# Patient Record
Sex: FEMALE | Race: WHITE | NOT HISPANIC OR LATINO | Employment: FULL TIME | URBAN - METROPOLITAN AREA
[De-identification: names, ages, dates, MRNs, and addresses within clinical notes are randomized per-mention and may not be internally consistent; named-entity substitution may affect disease eponyms.]

---

## 2021-09-08 ENCOUNTER — OFFICE VISIT (OUTPATIENT)
Dept: DERMATOLOGY | Facility: IMAGING CENTER | Age: 61
End: 2021-09-08
Payer: COMMERCIAL

## 2021-09-08 VITALS — WEIGHT: 140 LBS | BODY MASS INDEX: 21.97 KG/M2 | TEMPERATURE: 97.6 F | HEIGHT: 67 IN

## 2021-09-08 DIAGNOSIS — D23.9 DERMATOFIBROMA: ICD-10-CM

## 2021-09-08 DIAGNOSIS — L82.1 SEBORRHEIC KERATOSES: ICD-10-CM

## 2021-09-08 DIAGNOSIS — L28.1 PRURIGO NODULARIS: ICD-10-CM

## 2021-09-08 PROCEDURE — 99203 OFFICE O/P NEW LOW 30 MIN: CPT | Performed by: NURSE PRACTITIONER

## 2021-09-08 RX ORDER — LORATADINE 10 MG/1
10 TABLET ORAL DAILY
COMMUNITY
End: 2023-04-07

## 2021-09-08 NOTE — PROGRESS NOTES
"DERMATOLOGY NOTE  NEW VISIT       Chief complaint: Skin Lesion     HPI/location: Posterior upper left arm, brown papule  Time present: 5 years  Painful lesion: No  Itching lesion: No  Enlarging lesion: No  Anything make it better or worse? No    HPI/location: right lower leg above, crusty papule  Time present: 5 years  Painful lesion: No  Itching lesion: No  Enlarging lesion: No  Anything make it better or worse? No        History of skin cancer: No  History of precancers/actinic keratoses: No  History of biopsies:Yes, Details: left forearm, benign 5+ years ago 2016  History of blistering/severe sunburns:No  Family history of skin cancer:No  Family history of atypical moles:No      Allergies   Allergen Reactions   • Penicillins         MEDICATIONS:  Medications relevant to specialty reviewed.     REVIEW OF SYSTEMS:   Positive for skin (see HPI)  Negative for fevers and chills       EXAM:  Temp 36.4 °C (97.6 °F)   Ht 1.702 m (5' 7\")   Wt 63.5 kg (140 lb)   BMI 21.93 kg/m²   Constitutional: Well-developed, well-nourished, and in no distress.     A focused skin exam was performed including the affected areas of the left upper arm. Notable findings on exam today listed below and/or in assessment/plan.     Firm, pink to hyperpigmented, dome-shaped papule to left upper arm  Macular sk to right calf  Hyperkeratotic papule to anterior right ankle    IMPRESSION / PLAN:    1. Dermatofibroma  - Benign-appearing nature of lesions discussed. Advised to return to clinic for any new or concerning changes.  PA started for punch removal    2. Prurigo nodule  - Benign-appearing nature of lesions discussed. Advised to return to clinic for any new or concerning changes.  PA started for punch removal    3. Seborrheic keratoses  - Benign-appearing nature of lesions discussed. Advised to return to clinic for any new or concerning changes.  - ABCDE's of melanoma discussed         Please note that this dictation was created using voice " recognition software. I have made every reasonable attempt to correct obvious errors, but I expect that there are errors of grammar and possibly content that I did not discover before finalizing the note.      Return to clinic in: Return for as needed. and as needed for any new or changing skin lesions.

## 2021-10-15 ENCOUNTER — TELEPHONE (OUTPATIENT)
Dept: DERMATOLOGY | Facility: IMAGING CENTER | Age: 61
End: 2021-10-15

## 2021-10-15 NOTE — TELEPHONE ENCOUNTER
Called pt x2 and x2 vmails to schedule an appointment with Amina Garland for a procedure. No answer, no call backs

## 2021-12-02 ENCOUNTER — OFFICE VISIT (OUTPATIENT)
Dept: DERMATOLOGY | Facility: IMAGING CENTER | Age: 61
End: 2021-12-02
Payer: COMMERCIAL

## 2021-12-02 DIAGNOSIS — D48.5 NEOPLASM OF UNCERTAIN BEHAVIOR OF SKIN: ICD-10-CM

## 2021-12-02 PROCEDURE — 11104 PUNCH BX SKIN SINGLE LESION: CPT | Performed by: NURSE PRACTITIONER

## 2021-12-02 PROCEDURE — 11105 PUNCH BX SKIN EA SEP/ADDL: CPT | Performed by: NURSE PRACTITIONER

## 2021-12-02 NOTE — PROGRESS NOTES
DERMATOLOGY NOTE  FOLLOW UP VISIT       Chief complaint: Skin Lesion     HPI/location: Posterior upper left arm, brown papule  Time present: 5 years  Painful lesion: No  Itching lesion: No  Enlarging lesion: No  Anything make it better or worse? No    HPI/location: right lower leg above, crusty papule  Time present: 5 years  Painful lesion: No  Itching lesion: No  Enlarging lesion: No  Anything make it better or worse? No        History of skin cancer: No  History of precancers/actinic keratoses: No  History of biopsies:Yes, Details: left forearm, benign 5+ years ago 2016  History of blistering/severe sunburns:No  Family history of skin cancer:No  Family history of atypical moles:No      Allergies   Allergen Reactions   • Penicillins         MEDICATIONS:  Medications relevant to specialty reviewed.     REVIEW OF SYSTEMS:   Positive for skin (see HPI)  Negative for fevers and chills       EXAM:  There were no vitals taken for this visit.  Constitutional: Well-developed, well-nourished, and in no distress.     A focused skin exam was performed including the affected areas of the left upper arm. Notable findings on exam today listed below and/or in assessment/plan.     4mm hyperpigmented papule to right upper arm  3mm hyperkeratotic papule to right lower leg    IMPRESSION / PLAN:    1. Neoplasm of uncertain behavior of skinx2  Procedure Note   Procedure: Biopsy by punch technique  Location: right upper arm  Preoperative diagnosis:DF vs. other  Risks, benefits and alternatives of procedure discussed and written informed consent obtained. Time out completed. Area of biopsy prepped with alcohol. Anesthesia with 1% lidocaine with epinephrine administered with a 30 gauge needle. 5  mm punch biopsy of site performed. Hemostasis achieved with pressure and 4.0 prolene sutures. Vaseline applied to wound with bandage. Patient tolerated procedure well, and there were no complications.  The pathology specimen was sent to the lab  via the staff.  Wound care was discussed with the patient.     Procedure Note   Procedure: Biopsy by punch technique  Location: right lower leg  Preoperative diagnosis:DF vs other  Risks, benefits and alternatives of procedure discussed and written informed consent obtained. Time out completed. Area of biopsy prepped with alcohol. Anesthesia with 1% lidocaine with epinephrine administered with a 30 gauge needle. 4  mm punch biopsy of site performed. Hemostasis achieved with pressure and 4.0 prolene sutures. Vaseline applied to wound with bandage. Patient tolerated procedure well, and there were no complications.  The pathology specimen was sent to the lab via the staff.  Wound care was discussed with the patient.          Please note that this dictation was created using voice recognition software. I have made every reasonable attempt to correct obvious errors, but I expect that there are errors of grammar and possibly content that I did not discover before finalizing the note.      Return to clinic in: Return for as needed and pending biopsy. and as needed for any new or changing skin lesions.

## 2021-12-10 ENCOUNTER — TELEPHONE (OUTPATIENT)
Dept: DERMATOLOGY | Facility: IMAGING CENTER | Age: 61
End: 2021-12-10

## 2021-12-10 NOTE — TELEPHONE ENCOUNTER
Patient notified of D- Path results both Bx sites benign   No further treatment needed . Scanned in media tab

## 2021-12-15 ENCOUNTER — APPOINTMENT (OUTPATIENT)
Dept: DERMATOLOGY | Facility: IMAGING CENTER | Age: 61
End: 2021-12-15
Payer: COMMERCIAL

## 2022-08-26 ENCOUNTER — HOSPITAL ENCOUNTER (EMERGENCY)
Facility: MEDICAL CENTER | Age: 62
End: 2022-08-27
Attending: EMERGENCY MEDICINE
Payer: COMMERCIAL

## 2022-08-26 DIAGNOSIS — N30.01 ACUTE HEMORRHAGIC CYSTITIS: ICD-10-CM

## 2022-08-26 DIAGNOSIS — I10 HYPERTENSION, UNSPECIFIED TYPE: ICD-10-CM

## 2022-08-26 DIAGNOSIS — R30.0 DYSURIA: ICD-10-CM

## 2022-08-26 LAB
APPEARANCE UR: ABNORMAL
BACTERIA #/AREA URNS HPF: ABNORMAL /HPF
BILIRUB UR QL STRIP.AUTO: NEGATIVE
COLOR UR: ABNORMAL
EPI CELLS #/AREA URNS HPF: ABNORMAL /HPF
GLUCOSE UR STRIP.AUTO-MCNC: NEGATIVE MG/DL
KETONES UR STRIP.AUTO-MCNC: NEGATIVE MG/DL
LEUKOCYTE ESTERASE UR QL STRIP.AUTO: ABNORMAL
MICRO URNS: ABNORMAL
NITRITE UR QL STRIP.AUTO: NEGATIVE
PH UR STRIP.AUTO: 5.5 [PH] (ref 5–8)
PROT UR QL STRIP: 100 MG/DL
RBC # URNS HPF: >150 /HPF
RBC UR QL AUTO: ABNORMAL
SP GR UR STRIP.AUTO: <=1.005
WBC #/AREA URNS HPF: ABNORMAL /HPF

## 2022-08-26 PROCEDURE — 36415 COLL VENOUS BLD VENIPUNCTURE: CPT

## 2022-08-26 PROCEDURE — 99284 EMERGENCY DEPT VISIT MOD MDM: CPT

## 2022-08-26 PROCEDURE — 81001 URINALYSIS AUTO W/SCOPE: CPT

## 2022-08-26 PROCEDURE — 87086 URINE CULTURE/COLONY COUNT: CPT

## 2022-08-26 RX ORDER — CEFTRIAXONE 2 G/1
2 INJECTION, POWDER, FOR SOLUTION INTRAMUSCULAR; INTRAVENOUS ONCE
Status: COMPLETED | OUTPATIENT
Start: 2022-08-27 | End: 2022-08-27

## 2022-08-26 RX ORDER — SODIUM CHLORIDE 9 MG/ML
1000 INJECTION, SOLUTION INTRAVENOUS ONCE
Status: COMPLETED | OUTPATIENT
Start: 2022-08-27 | End: 2022-08-27

## 2022-08-27 ENCOUNTER — HOSPITAL ENCOUNTER (OUTPATIENT)
Dept: RADIOLOGY | Facility: MEDICAL CENTER | Age: 62
End: 2022-08-27
Attending: EMERGENCY MEDICINE
Payer: COMMERCIAL

## 2022-08-27 VITALS
SYSTOLIC BLOOD PRESSURE: 160 MMHG | DIASTOLIC BLOOD PRESSURE: 100 MMHG | OXYGEN SATURATION: 95 % | RESPIRATION RATE: 18 BRPM | BODY MASS INDEX: 22.76 KG/M2 | TEMPERATURE: 99.3 F | WEIGHT: 145 LBS | HEIGHT: 67 IN | HEART RATE: 93 BPM

## 2022-08-27 LAB
ALBUMIN SERPL BCP-MCNC: 4.3 G/DL (ref 3.2–4.9)
ALBUMIN/GLOB SERPL: 2 G/DL
ALP SERPL-CCNC: 97 U/L (ref 30–99)
ALT SERPL-CCNC: 13 U/L (ref 2–50)
ANION GAP SERPL CALC-SCNC: 11 MMOL/L (ref 7–16)
AST SERPL-CCNC: 16 U/L (ref 12–45)
BASOPHILS # BLD AUTO: 0.3 % (ref 0–1.8)
BASOPHILS # BLD: 0.04 K/UL (ref 0–0.12)
BILIRUB SERPL-MCNC: 0.4 MG/DL (ref 0.1–1.5)
BUN SERPL-MCNC: 16 MG/DL (ref 8–22)
CALCIUM SERPL-MCNC: 9.9 MG/DL (ref 8.4–10.2)
CHLORIDE SERPL-SCNC: 97 MMOL/L (ref 96–112)
CO2 SERPL-SCNC: 22 MMOL/L (ref 20–33)
CREAT SERPL-MCNC: 0.79 MG/DL (ref 0.5–1.4)
EOSINOPHIL # BLD AUTO: 0.15 K/UL (ref 0–0.51)
EOSINOPHIL NFR BLD: 1.2 % (ref 0–6.9)
ERYTHROCYTE [DISTWIDTH] IN BLOOD BY AUTOMATED COUNT: 41.5 FL (ref 35.9–50)
GFR SERPLBLD CREATININE-BSD FMLA CKD-EPI: 85 ML/MIN/1.73 M 2
GLOBULIN SER CALC-MCNC: 2.2 G/DL (ref 1.9–3.5)
GLUCOSE SERPL-MCNC: 117 MG/DL (ref 65–99)
HCT VFR BLD AUTO: 40.2 % (ref 37–47)
HGB BLD-MCNC: 13.8 G/DL (ref 12–16)
IMM GRANULOCYTES # BLD AUTO: 0.04 K/UL (ref 0–0.11)
IMM GRANULOCYTES NFR BLD AUTO: 0.3 % (ref 0–0.9)
LYMPHOCYTES # BLD AUTO: 1.88 K/UL (ref 1–4.8)
LYMPHOCYTES NFR BLD: 15.4 % (ref 22–41)
MCH RBC QN AUTO: 31.8 PG (ref 27–33)
MCHC RBC AUTO-ENTMCNC: 34.3 G/DL (ref 33.6–35)
MCV RBC AUTO: 92.6 FL (ref 81.4–97.8)
MONOCYTES # BLD AUTO: 1 K/UL (ref 0–0.85)
MONOCYTES NFR BLD AUTO: 8.2 % (ref 0–13.4)
NEUTROPHILS # BLD AUTO: 9.06 K/UL (ref 2–7.15)
NEUTROPHILS NFR BLD: 74.6 % (ref 44–72)
NRBC # BLD AUTO: 0 K/UL
NRBC BLD-RTO: 0 /100 WBC
PLATELET # BLD AUTO: 195 K/UL (ref 164–446)
PMV BLD AUTO: 8.8 FL (ref 9–12.9)
POTASSIUM SERPL-SCNC: 3.7 MMOL/L (ref 3.6–5.5)
PROT SERPL-MCNC: 6.5 G/DL (ref 6–8.2)
RBC # BLD AUTO: 4.34 M/UL (ref 4.2–5.4)
SODIUM SERPL-SCNC: 130 MMOL/L (ref 135–145)
WBC # BLD AUTO: 12.2 K/UL (ref 4.8–10.8)

## 2022-08-27 PROCEDURE — 700105 HCHG RX REV CODE 258: Performed by: EMERGENCY MEDICINE

## 2022-08-27 PROCEDURE — 700111 HCHG RX REV CODE 636 W/ 250 OVERRIDE (IP): Performed by: EMERGENCY MEDICINE

## 2022-08-27 PROCEDURE — 85025 COMPLETE CBC W/AUTO DIFF WBC: CPT

## 2022-08-27 PROCEDURE — 96374 THER/PROPH/DIAG INJ IV PUSH: CPT

## 2022-08-27 PROCEDURE — 80053 COMPREHEN METABOLIC PANEL: CPT

## 2022-08-27 PROCEDURE — 74176 CT ABD & PELVIS W/O CONTRAST: CPT

## 2022-08-27 RX ORDER — CEFDINIR 300 MG/1
300 CAPSULE ORAL 2 TIMES DAILY
Qty: 10 CAPSULE | Refills: 0 | Status: SHIPPED | OUTPATIENT
Start: 2022-08-27 | End: 2022-09-01

## 2022-08-27 RX ADMIN — CEFTRIAXONE SODIUM 2 G: 2 INJECTION, POWDER, FOR SOLUTION INTRAMUSCULAR; INTRAVENOUS at 00:12

## 2022-08-27 RX ADMIN — SODIUM CHLORIDE 1000 ML: 9 INJECTION, SOLUTION INTRAVENOUS at 00:12

## 2022-08-27 NOTE — ED TRIAGE NOTES
"Pt here with c/o    Chief Complaint   Patient presents with    Painful Urination     Burns with urination    Blood in Urine     Started tonight       BP (!) 160/116   Pulse 82   Temp 36.8 °C (98.3 °F) (Temporal)   Resp 16   Ht 1.702 m (5' 7\")   Wt 65.8 kg (145 lb)   SpO2 98%   BMI 22.71 kg/m²    "

## 2022-08-27 NOTE — ED PROVIDER NOTES
"ED Provider Note    CHIEF COMPLAINT  Chief Complaint   Patient presents with    Painful Urination     Burns with urination    Blood in Urine     Started tonight     HPI  Patient is a 61-year-old female who presents emergency room for dysuria.  The patient states that she was feeling some uncomfortableness and lower abdominal pressure sensations.  She started noticing the passage of blood in her urine and continued discomfort and came in for further assessment.  Patient states she has no prior history of kidney stones, she has not noticed any fevers, chills, she does not have other chronic medical illnesses and is not on immunosuppression.  She denies any cough, congestion, viral illness, chest pain or shortness of breath.    PPE Note: I personally donned full PPE for all patient encounters during this visit, including being clean-shaven with an N95 respirator mask, gloves, and goggles.     REVIEW OF SYSTEMS  See HPI for further details. All other systems are negative.     PAST MEDICAL HISTORY   Prior UTI    SOCIAL HISTORY  Social History     Tobacco Use    Smoking status: Never    Smokeless tobacco: Never   Vaping Use    Vaping Use: Never used   Substance and Sexual Activity    Alcohol use: Yes    Drug use: No    Sexual activity: Not on file     SURGICAL HISTORY   has a past surgical history that includes primary c section.    CURRENT MEDICATIONS  Home Medications       Reviewed by Jennifer Hui R.N. (Registered Nurse) on 08/26/22 at 2308  Med List Status: Partial     Medication Last Dose Status   loratadine (CLARITIN) 10 MG Tab prn Active                  ALLERGIES  Allergies   Allergen Reactions    Penicillins      PHYSICAL EXAM  VITAL SIGNS: BP (!) 160/100   Pulse 93   Temp 37.4 °C (99.3 °F) (Temporal)   Resp 18   Ht 1.702 m (5' 7\")   Wt 65.8 kg (145 lb)   SpO2 95%   BMI 22.71 kg/m²   Pulse ox interpretation: I interpret this pulse ox as normal.  Genl: F sitting in gurney uncomfortably, speaking " clearly, appears in very mild distress   Head: NC/AT   ENT: Mucous membranes moist, posterior pharynx clear, uvula midline, nares patent bilaterally   Eyes: Normal sclera, pupils equal round reactive to light  Pulmonary: Lungs are clear to auscultation bilaterally  CV:  RRR, no murmur appreciated, pulses 2+ in both upper and lower extremities,  Abdomen: soft, suprapubic discomfort, no true McBurney's point tenderness, no Delgado sign.  ND; no rebound/guarding  : no CVA tenderness   Musculoskeletal: Pain free ROM of the neck. Moving upper and lower extremities and spontaneous in coordinated fashion  Neuro: A&Ox4 (person, place, time, situation), speech fluent, gait steady, no focal deficits appreciated, No cerebellar signs. Sensation is grossly intact in the distal upper and lower extremities.  5/5 strength in  and dorsiflexion/plantar flexion of the ankles  Psych: Patient has an appropriate affect and behavior  Skin: No rash or lesions.  No pallor or jaundice.  No cyanosis.  Warm and dry.     DIAGNOSTIC STUDIES / PROCEDURES    LABS  Labs Reviewed   URINALYSIS,CULTURE IF INDICATED - Abnormal; Notable for the following components:       Result Value    Character Hazy (*)     Protein 100 (*)     Leukocyte Esterase Moderate (*)     Occult Blood Large (*)     All other components within normal limits    Narrative:     Indication for culture:->Patient WITHOUT an indwelling [place]  catheter in place with new onset of Dysuria, Frequency,  Urgency, and/or Suprapubic pain   URINE MICROSCOPIC (W/UA) - Abnormal; Notable for the following components:    WBC  (*)     RBC >150 (*)     Bacteria Few (*)     All other components within normal limits    Narrative:     Indication for culture:->Patient WITHOUT an indwelling [place]  catheter in place with new onset of Dysuria, Frequency,  Urgency, and/or Suprapubic pain   CBC WITH DIFFERENTIAL - Abnormal; Notable for the following components:    WBC 12.2 (*)     MPV 8.8 (*)      Neutrophils-Polys 74.60 (*)     Lymphocytes 15.40 (*)     Neutrophils (Absolute) 9.06 (*)     Monos (Absolute) 1.00 (*)     All other components within normal limits   COMP METABOLIC PANEL - Abnormal; Notable for the following components:    Sodium 130 (*)     Glucose 117 (*)     All other components within normal limits   ESTIMATED GFR   URINE CULTURE(NEW)    Narrative:     Indication for culture:->Patient WITHOUT an indwelling [place]  catheter in place with new onset of Dysuria, Frequency,  Urgency, and/or Suprapubic pain     RADIOLOGY  CT-RENAL COLIC EVALUATION(A/P W/O)   Final Result                 COURSE & MEDICAL DECISION MAKING  Pertinent Labs & Imaging studies reviewed. (See chart for details)    DDX: UTI, cystitis, hemorrhagic cystitis, pyelitis versus pyelonephritis unlikely, nephrolithiasis.  Kidney injury, electrolyte derangement.    MDM  Initial evaluation at 2349:  Seen and evaluated for symptoms as described above.  The patient was initially hypertensive, was uncomfortable, but was thankfully without febrile illness or tachycardia.  Patient has discomfort more consistent with acute UTI.  Lab work showed very scant leukocytosis with leftward shift but no bandemia.  Haziness with RBCs and WBCs with bacteria points towards cystitis, hemorrhagic cystitis versus possible cystitis with concurrent nephrolithiasis.  I have had discussion with the patient and she is amenable to administration of antibiotics and fluids for the possibility of concurrent stone pathology.  She tolerates this well and subsequent CT renal colic study thankfully shows no evidence of concurrent stone.  She can be treated with outpatient course of oral antibiotics with strict return precautions.      HYDRATION: Based on the patient's presentation of Other facilitate possible stone passage the patient was given IV fluids. IV Hydration was used because oral hydration was not adequate alone. Upon recheck following hydration, the patient  was improved.  HTN/IDDM FOLLOW UP:  The patient is referred to a primary physician for blood pressure management, diabetic screening, and for all other preventive health concerns    FINAL IMPRESSION  Visit Diagnoses     ICD-10-CM   1. Acute hemorrhagic cystitis  N30.01   2. Dysuria  R30.0   3. Hypertension, unspecified type  I10     Electronically signed by: Jeff Avila M.D., 8/26/2022 11:49 PM

## 2022-08-29 LAB
BACTERIA UR CULT: NORMAL
SIGNIFICANT IND 70042: NORMAL
SITE SITE: NORMAL
SOURCE SOURCE: NORMAL

## 2023-04-07 ENCOUNTER — OFFICE VISIT (OUTPATIENT)
Dept: URGENT CARE | Facility: CLINIC | Age: 63
End: 2023-04-07
Payer: COMMERCIAL

## 2023-04-07 VITALS
DIASTOLIC BLOOD PRESSURE: 88 MMHG | RESPIRATION RATE: 16 BRPM | OXYGEN SATURATION: 99 % | SYSTOLIC BLOOD PRESSURE: 156 MMHG | BODY MASS INDEX: 22.76 KG/M2 | HEART RATE: 114 BPM | HEIGHT: 67 IN | TEMPERATURE: 98.4 F | WEIGHT: 145 LBS

## 2023-04-07 DIAGNOSIS — R30.0 DYSURIA: ICD-10-CM

## 2023-04-07 PROCEDURE — 99203 OFFICE O/P NEW LOW 30 MIN: CPT | Performed by: PHYSICIAN ASSISTANT

## 2023-04-07 RX ORDER — IBUPROFEN 200 MG
200 TABLET ORAL EVERY 6 HOURS PRN
COMMUNITY
End: 2023-08-17

## 2023-04-07 ASSESSMENT — ENCOUNTER SYMPTOMS
CONSTITUTIONAL NEGATIVE: 1
DIARRHEA: 0
FLANK PAIN: 0
VOMITING: 0
ABDOMINAL PAIN: 0
RESPIRATORY NEGATIVE: 1
CARDIOVASCULAR NEGATIVE: 1
NAUSEA: 0

## 2023-04-07 ASSESSMENT — FIBROSIS 4 INDEX: FIB4 SCORE: 1.39

## 2023-04-07 NOTE — PROGRESS NOTES
"  Subjective:     Madeline Bernal  is a 62 y.o. female who presents for Dysuria (X4 days )       She presents today with dysuria symptoms have been ongoing for last 4 days.  She did scribes the symptoms as a \"discomfort when her bladder is full and distended but does improve after she is able to urinate\".  No fever/chills/sweats, no flank pain.  No vaginal pain, no vaginal bleeding, no vaginal discharge.  Denies fever/chills/sweats, chest pain, shortness of breath, nausea/vomiting, abdominal pain, diarrhea.     Review of Systems   Constitutional: Negative.    Respiratory: Negative.     Cardiovascular: Negative.    Gastrointestinal:  Negative for abdominal pain, diarrhea, nausea and vomiting.   Genitourinary:  Positive for dysuria. Negative for flank pain, frequency, hematuria and urgency.    Allergies   Allergen Reactions    Penicillins      History reviewed. No pertinent past medical history.     Objective:   BP (!) 156/88   Pulse (!) 114   Temp 36.9 °C (98.4 °F) (Temporal)   Resp 16   Ht 1.689 m (5' 6.5\")   Wt 65.8 kg (145 lb)   SpO2 99%   BMI 23.05 kg/m²   Physical Exam  Vitals and nursing note reviewed.   Constitutional:       General: She is not in acute distress.     Appearance: She is not ill-appearing or toxic-appearing.   HENT:      Head: Normocephalic.   Eyes:      General: No scleral icterus.     Conjunctiva/sclera: Conjunctivae normal.   Cardiovascular:      Rate and Rhythm: Normal rate and regular rhythm.   Pulmonary:      Effort: Pulmonary effort is normal. No respiratory distress.      Breath sounds: Normal breath sounds. No stridor.   Abdominal:      General: Abdomen is flat. Bowel sounds are normal. There is no distension.      Palpations: Abdomen is soft.      Tenderness: There is no abdominal tenderness. There is no right CVA tenderness, left CVA tenderness or guarding.   Musculoskeletal:      Cervical back: Neck supple.   Neurological:      Mental Status: She is alert and oriented " to person, place, and time.   Psychiatric:         Mood and Affect: Mood normal.         Behavior: Behavior normal.         Thought Content: Thought content normal.         Judgment: Judgment normal.           Diagnostic testing:    POC urinalysis-all within normal limits    Assessment/Plan:     Encounter Diagnoses   Name Primary?    Dysuria           Plan for care for today's complaint includes over the counter anti-inflammatory medications for dysuria. No evidence of urinary tract infection based on urinalysis taken today.  Continue to monitor symptoms and return to urgent care or follow-up with primary care provider if symptoms remain ongoing.  Follow-up in the emergency department if symptoms become severe, ER precautions discussed in office today..    See AVS Instructions below for written guidance provided to patient on after-visit management and care in addition to our verbal discussion during the visit.    Please note that this dictation was created using voice recognition software. I have attempted to correct all errors, but there may be sound-alike, spelling, grammar and possibly content errors that I did not discover before finalizing the note.    Angus Larios PA-C

## 2023-08-17 ENCOUNTER — OFFICE VISIT (OUTPATIENT)
Dept: URGENT CARE | Facility: CLINIC | Age: 63
End: 2023-08-17
Payer: COMMERCIAL

## 2023-08-17 VITALS
HEIGHT: 67 IN | RESPIRATION RATE: 17 BRPM | TEMPERATURE: 98.3 F | WEIGHT: 145 LBS | OXYGEN SATURATION: 96 % | SYSTOLIC BLOOD PRESSURE: 140 MMHG | BODY MASS INDEX: 22.76 KG/M2 | DIASTOLIC BLOOD PRESSURE: 82 MMHG | HEART RATE: 114 BPM

## 2023-08-17 DIAGNOSIS — J06.9 VIRAL URI WITH COUGH: ICD-10-CM

## 2023-08-17 LAB
FLUAV RNA SPEC QL NAA+PROBE: NEGATIVE
FLUBV RNA SPEC QL NAA+PROBE: NEGATIVE
RSV RNA SPEC QL NAA+PROBE: NEGATIVE
SARS-COV-2 RNA RESP QL NAA+PROBE: NEGATIVE

## 2023-08-17 PROCEDURE — 0241U POCT CEPHEID COV-2, FLU A/B, RSV - PCR: CPT | Performed by: FAMILY MEDICINE

## 2023-08-17 PROCEDURE — 99213 OFFICE O/P EST LOW 20 MIN: CPT | Performed by: FAMILY MEDICINE

## 2023-08-17 PROCEDURE — 3077F SYST BP >= 140 MM HG: CPT | Performed by: FAMILY MEDICINE

## 2023-08-17 PROCEDURE — 3079F DIAST BP 80-89 MM HG: CPT | Performed by: FAMILY MEDICINE

## 2023-08-17 RX ORDER — DEXTROMETHORPHAN HYDROBROMIDE AND PROMETHAZINE HYDROCHLORIDE 15; 6.25 MG/5ML; MG/5ML
5 SYRUP ORAL EVERY 6 HOURS PRN
Qty: 120 ML | Refills: 0 | Status: SHIPPED | OUTPATIENT
Start: 2023-08-17

## 2023-08-17 ASSESSMENT — FIBROSIS 4 INDEX: FIB4 SCORE: 1.39

## 2023-08-17 ASSESSMENT — ENCOUNTER SYMPTOMS: SINUS PAIN: 1

## 2023-08-17 NOTE — PROGRESS NOTES
"Subjective     Madeline Bernal is a 62 y.o. female who presents with Sinus Problem (3 days)      - This is a pleasant and nontoxic appearing 62 y.o. person who has come to the walk-in clinic today for:    #1) 5 days w/ some cough at night and stuffy/runny nose. No nvfc.       ALLERGIES:  Penicillins     PMH:  History reviewed. No pertinent past medical history.     PSH:  Past Surgical History:   Procedure Laterality Date    PRIMARY C SECTION             MEDS:    Current Outpatient Medications:     promethazine-dextromethorphan (PROMETHAZINE-DM) 6.25-15 MG/5ML syrup, Take 5 mL by mouth every 6 hours as needed for Cough., Disp: 120 mL, Rfl: 0    cyclobenzaprine (FLEXERIL) 10 mg Tab, Take 1 Tablet by mouth 3 times a day as needed for Moderate Pain., Disp: 30 Tablet, Rfl: 0    ** I have documented what I find to be significant in regards to past medical, social, family and surgical history  in my HPI or under PMH/PSH/FH review section, otherwise it is noncontributory **         HPI    Review of Systems   HENT:  Positive for congestion and sinus pain.    All other systems reviewed and are negative.             Objective     BP (!) 140/82   Pulse (!) 114   Temp 36.8 °C (98.3 °F)   Resp 17   Ht 1.702 m (5' 7\")   Wt 65.8 kg (145 lb)   SpO2 96%   BMI 22.71 kg/m²      HR recheck 100    Physical Exam  Vitals and nursing note reviewed.   Constitutional:       General: She is not in acute distress.     Appearance: Normal appearance. She is well-developed.   HENT:      Head: Normocephalic.      Mouth/Throat:      Mouth: Mucous membranes are moist.      Pharynx: Oropharynx is clear. No oropharyngeal exudate or posterior oropharyngeal erythema.   Cardiovascular:      Heart sounds: Normal heart sounds. No murmur heard.  Pulmonary:      Effort: Pulmonary effort is normal. No respiratory distress.      Breath sounds: Normal breath sounds. No wheezing, rhonchi or rales.   Neurological:      Mental Status: She is " alert.      Motor: No abnormal muscle tone.   Psychiatric:         Mood and Affect: Mood normal.         Behavior: Behavior normal.         Assessment & Plan     1. Viral URI with cough  promethazine-dextromethorphan (PROMETHAZINE-DM) 6.25-15 MG/5ML syrup    POCT CEPHEID COV-2, FLU A/B, RSV - PCR          - Dx, plan & d/c instructions discussed   - Rest, stay hydrated, OTC Motrin and/or Tylenol as needed      Follow up with your regular primary care providers office for a recheck on today's visit. ER if not improving in 2-3 days or if feeling/getting worse.     Patient left in stable condition     POCT results reviewed/discussed    Pertinent prior office visit notes in [place] have been reviewed by me today on day of this visit.